# Patient Record
Sex: FEMALE | URBAN - METROPOLITAN AREA
[De-identification: names, ages, dates, MRNs, and addresses within clinical notes are randomized per-mention and may not be internally consistent; named-entity substitution may affect disease eponyms.]

---

## 2020-11-03 ENCOUNTER — OFFICE VISIT (OUTPATIENT)
Dept: URBAN - METROPOLITAN AREA CLINIC 13 | Facility: CLINIC | Age: 70
End: 2020-11-03
Payer: COMMERCIAL

## 2020-11-03 DIAGNOSIS — H35.351 CYSTOID MACULAR DEGENERATION, RIGHT EYE: ICD-10-CM

## 2020-11-03 DIAGNOSIS — H59.811 CHORIORETINAL SCARS AFTER SURGERY FOR DETACHMENT, RIGHT EYE: Primary | ICD-10-CM

## 2020-11-03 PROCEDURE — 92134 CPTRZ OPH DX IMG PST SGM RTA: CPT | Performed by: OPHTHALMOLOGY

## 2020-11-03 PROCEDURE — 99213 OFFICE O/P EST LOW 20 MIN: CPT | Performed by: OPHTHALMOLOGY

## 2020-11-03 ASSESSMENT — INTRAOCULAR PRESSURE
OS: 13
OD: 14

## 2020-11-03 NOTE — IMPRESSION/PLAN
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 OCT: 11/3/20 OD: No ME Plan: Doing well. Retina attached. IOP in good range. Resolved ME today on Durezol. Discussed with patient option of STK vs Durezol/[place]. Pt did not get [place]. Will taper Durezol. BID x 2 weeks then qday x 2 weeks Rec follow up with Optometry for refraction. 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 Fortunately, she is attached without any recurrent disease, however, she has limited vision. I explained to her that although anatomy has improved, her vision may not recover a 100% and this is the nature of the condition.  

RTC 3 months DFE OD OCT OD